# Patient Record
Sex: MALE | URBAN - METROPOLITAN AREA
[De-identification: names, ages, dates, MRNs, and addresses within clinical notes are randomized per-mention and may not be internally consistent; named-entity substitution may affect disease eponyms.]

---

## 2020-06-15 ENCOUNTER — CONVERSION ENCOUNTER (OUTPATIENT)
Dept: NEUROLOGY | Facility: CLINIC | Age: 50
End: 2020-06-15

## 2020-06-15 ENCOUNTER — OFFICE VISIT CONVERTED (OUTPATIENT)
Dept: NEUROSURGERY | Facility: CLINIC | Age: 50
End: 2020-06-15
Attending: NEUROLOGICAL SURGERY

## 2021-05-10 NOTE — H&P
History and Physical      Patient Name: Cory Torres   Patient ID: [id number]   Sex: Male   YOB: 1970        Visit Date: Linda 15, 2020    Provider: Jonas Bonilla MD   Location: [facility]   Location Address: [address] [place] [address]  [id number]   Location Phone: [phone]          Chief Complaint  · Back pain      History Of Present Illness  The patient is a 49 year old male, who presents on referral from [facility], for a neurosurgical evaluation of low back pain.   The pain developed acutely 8 Jan 2019 It is 7/10 in severity has a dull and a sharp quality and radiates into the bilateral posteriorly distribution. The pain has been waxing and waning in severity. The pain tends to be maximal at no specific time, but waxes and wanes in severity throughout the day. The patient states the pain is aggravated by bending. It is alleviated by lying supine.   He also reports occasional tingling in both LE in posterior distribution. The patient has no prior history of neck or back surgery.   RECENT INTERVENTIONS:  He has been previously treated with NSAIDs and pain medication.   INFORMATION REVIEWED:  The following information was reviewed: radiology reports and images. MRI of the lumbar spine revealed a herniated disc and degenerative disk disease. The herniated disc is at L4-5 on the left foraminal. The degenerative disc disease is present at multiple levels.       Past Medical History  Hyperlipidemia; Lumbago         Past Surgical History  Joint Surgery         Medication List  Aspir-81 81 mg oral tablet,delayed release (DR/EC); fiber oral powder; Fish Oil Concentrate 1,000 mg oral capsule; flaxseed oil 1,000 mg oral capsule; multivitamin oral capsule; pravastatin 20 mg oral tablet; Probiotic 20 billion cell oral capsule; Vitamin B-12 100 mcg oral tablet; vitamin E 100 unit oral capsule         Allergy List  erythromycin; PENICILLINS; SULFA (SULFONAMIDES)  "      Allergies Reconciled  Family Medical History  Family history of cancer         Social History  Alcohol (Current some day); lives with spouse; ; Tobacco (Never); Working         Review of Systems  · Constitutional  o Denies  o : chills, excessive sweating, fatigue, fever, sycope/passing out, weight gain, weight loss  · Eyes  o Denies  o : changes in vision, blurry vision, double vision  · HENT  o Denies  o : loss of hearing, ringing in the ears, ear aches, sore throat, nasal congestion, sinus pain, nose bleeds, seasonal allergies  · Cardiovascular  o Denies  o : blood clots, swollen legs, anemia, easy burising or bleeding, transfusions  · Respiratory  o Denies  o : shortness of breath, dry cough, productive cough, pneumonia, COPD  · Gastrointestinal  o Denies  o : difficulty swallowing, reflux  · Genitourinary  o Denies  o : incontinence  · Neurologic  o Admits  o : numbness/tingling/paresthesia   o Denies  o : headache, seizure, stroke, tremor, loss of balance, falls, dizziness/vertigo, difficulty with sleep, difficulty with coordination, difficulty with dexterity, weakness  · Musculoskeletal  o Admits  o : joint pain, low back pain  o Denies  o : neck stiffness/pain, swollen lymph nodes, muscle aches, weakness, spasms, sciatica, pain radiating in arm, pain radiating in leg  · Endocrine  o Denies  o : diabetes, thyroid disorder  · Psychiatric  o Denies  o : anxiety, depression      Vitals  Date Time BP Position Site L\R Cuff Size HR RR TEMP (F) WT  HT  BMI kg/m2 BSA m2 O2 Sat        06/15/2020 08:30 AM        96.7 245lbs 0oz 5'  11\" 34.17 2.36           Physical Examination  · Constitutional  o Appearance  o : well-nourished, well developed, alert, in no acute distress  · Respiratory  o Respiratory Effort  o : breathing unlabored  · Cardiovascular  o Peripheral Vascular System  o :   § Extremities  § : no edema or cyanosis  · Musculoskeletal  o Spine  o :   § Inspection/Palpation  § : no spinal " tenderness or misalignment  o Right Lower Extremity  o :   § Inspection/Palpation  § : no joint or limb tenderness to palpation, no edema present, no ecchymosis  § Joint Stability  § : joint stability within normal limits  § Range of Motion  § : range of motion normal, no joint crepitations present, + pain on motion, Joshua's test ++  o Left Lower Extremity  o :   § Inspection/Palpation  § : no joint or limb tenderness to palpation, no edema present, no ecchymosis  § Joint Stability  § : joint stability within normal limits  § Range of Motion  § : range of motion normal, no joint crepitations present, + pain on motion, Joshua's test ++  · Skin and Subcutaneous Tissue  o Extremities  o :   § Right Lower Extremity  § : no lesions or areas of discoloration  § Left Lower Extremity  § : no lesions or areas of discoloration  o Back  o : no lesions or areas of discoloration  · Neurologic  o Mental Status Examination  o :   § Orientation  § : alert and oriented to time, person, place and events  o Motor Examination  o :   § RLE Strength  § : strength normal  § RLE Motor Function  § : tone normal, no atrophy, no abnormal movements noted  § LLE Strength  § : strength normal  § LLE Motor Function  § : tone normal, no atrophy, no abnormal movements noted  o Reflexes  o :   § RLE  § : knee and ankle reflexes 2/4, SLR negative  § LLE  § : knee and ankle reflexes 2/4, SLR negative  o Sensation  o :   § Light Touch  § : sensation intact to light touch in extremities  o Gait and Station  o :   § Gait Screening  § : normal gait, able to stand without difficulty  · Psychiatric  o Mood and Affect  o : mood normal, affect appropriate          Assessment  · Lumbago/low back pain     724.3/M54.40  chronic  · Lumbar disc herniation, L4-5     722.10/M51.26  to left, far lateral without radiculopathy    Problems Reconciled  Plan  · Orders  o ACO-17: Screened for tobacco use AND received tobacco cessation intervention (6174F) - -  06/15/2020  o PHYSICAL THERAPY CONSULTATION ([facility]) - 724.3/M54.40 - 06/15/2020  · Medications  o Medications have been Reconciled  o Transition of Care or Provider Policy  · Instructions  o Tobacco Cessation Counseling: non-user  o Encouraged to follow-up with Primary Care Provider for preventative care.  o The ROS and the PFSH were reviewed at today's visit.  o I have discussed the risks and benefits of surgery versus physical therapy, epidural steroids, and other conservative forms of treatment.  o Non-operative back pain: The patient is neurologically intact and ambulatory, and the patient has been advised that surgical treatment is not the most appropriate intervention at this time. The treatment options have been discussed with the patient.   o I have recommended that the patient undergo physical therapy. We have discussed that almost 2/3 of patients will respond to physical therapy alone. We also discussed the role of non-steroidal anti-inflammatory medications.   o Should these interventions not relieve the pain the next step would be a referral to pain management for consideration of options  o Handouts provided: Non-Surgical Treatments for Back Pain/Degenerative Disc Disease.  o We have discussed the benefits of core strengthening. Patient will work on improving the core muscle strength with low impact activities such as walking, swimming, Pilates, etc.   o Call or return to office if symptoms worsen or persist.  · Disposition  o Call or Return if symptoms worsen or persist.  o Follow Up PRN.            Electronically Signed by: Jonas Bonilla MD -Author on Linda 15, 2020 08:49:14 AM

## 2021-05-15 VITALS — WEIGHT: 245 LBS | HEIGHT: 71 IN | TEMPERATURE: 96.7 F | BODY MASS INDEX: 34.3 KG/M2
